# Patient Record
Sex: MALE | Race: BLACK OR AFRICAN AMERICAN | NOT HISPANIC OR LATINO | Employment: FULL TIME | URBAN - METROPOLITAN AREA
[De-identification: names, ages, dates, MRNs, and addresses within clinical notes are randomized per-mention and may not be internally consistent; named-entity substitution may affect disease eponyms.]

---

## 2024-01-21 ENCOUNTER — APPOINTMENT (OUTPATIENT)
Dept: CARDIOLOGY | Facility: HOSPITAL | Age: 29
End: 2024-01-21

## 2024-01-21 ENCOUNTER — HOSPITAL ENCOUNTER (EMERGENCY)
Facility: HOSPITAL | Age: 29
Discharge: HOME | End: 2024-01-21
Attending: EMERGENCY MEDICINE

## 2024-01-21 VITALS
RESPIRATION RATE: 18 BRPM | OXYGEN SATURATION: 99 % | DIASTOLIC BLOOD PRESSURE: 69 MMHG | WEIGHT: 135 LBS | HEART RATE: 56 BPM | HEIGHT: 71 IN | BODY MASS INDEX: 18.9 KG/M2 | TEMPERATURE: 97.9 F | SYSTOLIC BLOOD PRESSURE: 126 MMHG

## 2024-01-21 DIAGNOSIS — R42 VERTIGO: Primary | ICD-10-CM

## 2024-01-21 LAB
ALBUMIN SERPL BCP-MCNC: 4.3 G/DL (ref 3.4–5)
ALP SERPL-CCNC: 38 U/L (ref 33–120)
ALT SERPL W P-5'-P-CCNC: 14 U/L (ref 10–52)
ANION GAP SERPL CALC-SCNC: 10 MMOL/L (ref 10–20)
AST SERPL W P-5'-P-CCNC: 17 U/L (ref 9–39)
BASOPHILS # BLD AUTO: 0.05 X10*3/UL (ref 0–0.1)
BASOPHILS NFR BLD AUTO: 1.1 %
BILIRUB SERPL-MCNC: 1 MG/DL (ref 0–1.2)
BUN SERPL-MCNC: 12 MG/DL (ref 6–23)
CALCIUM SERPL-MCNC: 9.1 MG/DL (ref 8.6–10.3)
CARDIAC TROPONIN I PNL SERPL HS: <3 NG/L (ref 0–20)
CHLORIDE SERPL-SCNC: 104 MMOL/L (ref 98–107)
CO2 SERPL-SCNC: 30 MMOL/L (ref 21–32)
CREAT SERPL-MCNC: 1 MG/DL (ref 0.5–1.3)
EGFRCR SERPLBLD CKD-EPI 2021: >90 ML/MIN/1.73M*2
EOSINOPHIL # BLD AUTO: 0.12 X10*3/UL (ref 0–0.7)
EOSINOPHIL NFR BLD AUTO: 2.7 %
ERYTHROCYTE [DISTWIDTH] IN BLOOD BY AUTOMATED COUNT: 13.6 % (ref 11.5–14.5)
FLUAV RNA RESP QL NAA+PROBE: NOT DETECTED
FLUBV RNA RESP QL NAA+PROBE: NOT DETECTED
GLUCOSE SERPL-MCNC: 74 MG/DL (ref 74–99)
HCT VFR BLD AUTO: 46.6 % (ref 41–52)
HGB BLD-MCNC: 15.1 G/DL (ref 13.5–17.5)
IMM GRANULOCYTES # BLD AUTO: 0.01 X10*3/UL (ref 0–0.7)
IMM GRANULOCYTES NFR BLD AUTO: 0.2 % (ref 0–0.9)
LYMPHOCYTES # BLD AUTO: 1.37 X10*3/UL (ref 1.2–4.8)
LYMPHOCYTES NFR BLD AUTO: 31.1 %
MCH RBC QN AUTO: 27.5 PG (ref 26–34)
MCHC RBC AUTO-ENTMCNC: 32.4 G/DL (ref 32–36)
MCV RBC AUTO: 85 FL (ref 80–100)
MONOCYTES # BLD AUTO: 0.38 X10*3/UL (ref 0.1–1)
MONOCYTES NFR BLD AUTO: 8.6 %
NEUTROPHILS # BLD AUTO: 2.47 X10*3/UL (ref 1.2–7.7)
NEUTROPHILS NFR BLD AUTO: 56.3 %
NRBC BLD-RTO: 0 /100 WBCS (ref 0–0)
PLATELET # BLD AUTO: 199 X10*3/UL (ref 150–450)
POTASSIUM SERPL-SCNC: 4.3 MMOL/L (ref 3.5–5.3)
PROT SERPL-MCNC: 7.1 G/DL (ref 6.4–8.2)
RBC # BLD AUTO: 5.49 X10*6/UL (ref 4.5–5.9)
RSV RNA RESP QL NAA+PROBE: NOT DETECTED
SARS-COV-2 RNA RESP QL NAA+PROBE: NOT DETECTED
SODIUM SERPL-SCNC: 140 MMOL/L (ref 136–145)
WBC # BLD AUTO: 4.4 X10*3/UL (ref 4.4–11.3)

## 2024-01-21 PROCEDURE — 87637 SARSCOV2&INF A&B&RSV AMP PRB: CPT | Performed by: EMERGENCY MEDICINE

## 2024-01-21 PROCEDURE — 99283 EMERGENCY DEPT VISIT LOW MDM: CPT

## 2024-01-21 PROCEDURE — 93005 ELECTROCARDIOGRAM TRACING: CPT

## 2024-01-21 PROCEDURE — 85025 COMPLETE CBC W/AUTO DIFF WBC: CPT | Performed by: EMERGENCY MEDICINE

## 2024-01-21 PROCEDURE — 93010 ELECTROCARDIOGRAM REPORT: CPT | Performed by: EMERGENCY MEDICINE

## 2024-01-21 PROCEDURE — 36415 COLL VENOUS BLD VENIPUNCTURE: CPT | Performed by: EMERGENCY MEDICINE

## 2024-01-21 PROCEDURE — 99284 EMERGENCY DEPT VISIT MOD MDM: CPT | Performed by: EMERGENCY MEDICINE

## 2024-01-21 PROCEDURE — 84075 ASSAY ALKALINE PHOSPHATASE: CPT | Performed by: EMERGENCY MEDICINE

## 2024-01-21 PROCEDURE — 84484 ASSAY OF TROPONIN QUANT: CPT | Performed by: EMERGENCY MEDICINE

## 2024-01-21 PROCEDURE — 2500000001 HC RX 250 WO HCPCS SELF ADMINISTERED DRUGS (ALT 637 FOR MEDICARE OP): Performed by: EMERGENCY MEDICINE

## 2024-01-21 RX ORDER — MECLIZINE HYDROCHLORIDE 25 MG/1
25 TABLET ORAL ONCE
Status: COMPLETED | OUTPATIENT
Start: 2024-01-21 | End: 2024-01-21

## 2024-01-21 RX ORDER — MECLIZINE HYDROCHLORIDE 25 MG/1
25 TABLET ORAL 3 TIMES DAILY PRN
Qty: 9 TABLET | Refills: 0 | Status: SHIPPED | OUTPATIENT
Start: 2024-01-21 | End: 2024-01-24

## 2024-01-21 RX ADMIN — MECLIZINE HYDROCHLORIDE 25 MG: 25 TABLET ORAL at 18:01

## 2024-01-21 ASSESSMENT — COLUMBIA-SUICIDE SEVERITY RATING SCALE - C-SSRS
1. IN THE PAST MONTH, HAVE YOU WISHED YOU WERE DEAD OR WISHED YOU COULD GO TO SLEEP AND NOT WAKE UP?: NO
2. HAVE YOU ACTUALLY HAD ANY THOUGHTS OF KILLING YOURSELF?: NO
6. HAVE YOU EVER DONE ANYTHING, STARTED TO DO ANYTHING, OR PREPARED TO DO ANYTHING TO END YOUR LIFE?: NO

## 2024-01-21 ASSESSMENT — PAIN - FUNCTIONAL ASSESSMENT: PAIN_FUNCTIONAL_ASSESSMENT: 0-10

## 2024-01-21 ASSESSMENT — LIFESTYLE VARIABLES
REASON UNABLE TO ASSESS: NO
HAVE PEOPLE ANNOYED YOU BY CRITICIZING YOUR DRINKING: NO
HAVE YOU EVER FELT YOU SHOULD CUT DOWN ON YOUR DRINKING: NO
EVER FELT BAD OR GUILTY ABOUT YOUR DRINKING: NO
EVER HAD A DRINK FIRST THING IN THE MORNING TO STEADY YOUR NERVES TO GET RID OF A HANGOVER: NO

## 2024-01-21 ASSESSMENT — PAIN SCALES - GENERAL
PAINLEVEL_OUTOF10: 0 - NO PAIN
PAINLEVEL_OUTOF10: 0 - NO PAIN

## 2024-01-21 NOTE — DISCHARGE INSTRUCTIONS
Please return to the ER or seek immediate medical attention if you experience worsening headache, persistent vertigo, vomiting, fever of 38C (100.4) or higher, confusion, weakness, loss of motion in your arms or legs, loss of control of your urine or stool, neck pain, fainting, seizure, or any new or worsening symptoms.     You are welcome back any time. Thank you for entrusting your care to us, I hope we made your visit as pleasant as possible. Wishing you well!    Dr. Lawson

## 2024-01-21 NOTE — ED PROVIDER NOTES
EMERGENCY DEPARTMENT ENCOUNTER      Pt Name: Terrence Rodriguez  MRN: [id number]  Birthdate 1995  Date of evaluation: 1/21/2024  Provider: Charles Lawson DO    CHIEF COMPLAINT       Chief Complaint   Patient presents with    Dizziness     HISTORY OF PRESENT ILLNESS    Terrence Rodriguez is a 28 y.o. year old male who presents to the ER for dizziness. He describes it as more imbalance than lightheadedness, endorses difficulty walking In a straight line. Symptoms started while at work, approx. 4pm, have been persistent. He does endorse tobacco and marijuana use, last 0900. Denies headache, changes to vision/hearing, tinnitus, weakness, chest pain/dyspnea, nausea/vomiting, fever, syncope/presyncope. Endorses social drinking, last on new years.      PAST MEDICAL HISTORY   History reviewed. No pertinent past medical history.  CURRENT MEDICATIONS       Discharge Medication List as of 1/21/2024  7:08 PM        SURGICAL HISTORY     History reviewed. No pertinent surgical history.  ALLERGIES     Patient has no known allergies.  FAMILY HISTORY     No family history on file.  SOCIAL HISTORY       Social History     Tobacco Use    Smoking status: Unknown    Smokeless tobacco: Not on file   Substance Use Topics    Alcohol use: Not on file    Drug use: Not on file     SCREENINGS                  PHYSICAL EXAM  (up to 7 for level 4, 8 or more for level 5)     ED Triage Vitals   Temp Heart Rate Respirations BP   01/21/24 1712 01/21/24 1712 01/21/24 1712 01/21/24 1712   36.6 °C (97.9 °F) 64 16 130/80      Pulse Ox Temp Source Heart Rate Source Patient Position   01/21/24 1712 01/21/24 1712 01/21/24 1712 01/21/24 1831   98 % Temporal Monitor Sitting      BP Location FiO2 (%)     01/21/24 1831 --     Right arm        Physical Exam  Vitals and nursing note reviewed.   Constitutional:       General: He is not in acute distress.     Appearance: Normal appearance. He is not ill-appearing.   HENT:      Head: Normocephalic and  atraumatic. No contusion or laceration.      Jaw: No trismus or malocclusion.      Right Ear: Tympanic membrane, ear canal and external ear normal. There is no impacted cerumen.      Left Ear: Tympanic membrane, ear canal and external ear normal. There is no impacted cerumen.      Mouth/Throat:      Mouth: Mucous membranes are moist.      Pharynx: Oropharynx is clear.   Eyes:      Extraocular Movements: Extraocular movements intact.      Pupils: Pupils are equal, round, and reactive to light.   Neck:      Trachea: No tracheal deviation.   Cardiovascular:      Rate and Rhythm: Normal rate and regular rhythm.      Pulses: Normal pulses.   Pulmonary:      Effort: No respiratory distress.      Breath sounds: No wheezing, rhonchi or rales.   Chest:      Chest wall: No tenderness.   Abdominal:      General: Abdomen is flat. There is no distension.      Palpations: Abdomen is soft. There is no mass.      Tenderness: There is no abdominal tenderness. There is no right CVA tenderness or left CVA tenderness.   Musculoskeletal:         General: No tenderness or signs of injury.      Cervical back: Normal range of motion. No tenderness.      Right lower leg: No edema.      Left lower leg: No edema.   Skin:     Coloration: Skin is not jaundiced or pale.      Findings: No rash or wound.   Neurological:      Mental Status: He is alert and oriented to person, place, and time.      Sensory: No sensory deficit.      Comments: [place] hallpike positive on left. HINTS negative. Romberg negative. Slight wobble with pivot on gait exam. No cranial nerve deficits, strength, or sensorimotor deficits   Psychiatric:         Speech: Speech normal.         Behavior: Behavior normal.         Thought Content: Thought content does not include homicidal or suicidal ideation.         Judgment: Judgment normal.        DIAGNOSTIC RESULTS   LABS:  Labs Reviewed   COMPREHENSIVE METABOLIC PANEL - Normal       Result Value    Glucose 74      Sodium 140       Potassium 4.3      Chloride 104      Bicarbonate 30      Anion Gap 10      Urea Nitrogen 12      Creatinine 1.00      eGFR >90      Calcium 9.1      Albumin 4.3      Alkaline Phosphatase 38      Total Protein 7.1      AST 17      Bilirubin, Total 1.0      ALT 14     SARS-COV-2 AND INFLUENZA A/B PCR - Normal    Flu A Result Not Detected      Flu B Result Not Detected      Coronavirus 2019, PCR Not Detected      Narrative:     This assay has received FDA Emergency Use Authorization (EUA) and  is only authorized for the duration of time that circumstances exist to justify the authorization of the emergency use of in vitro diagnostic tests for the detection of SARS-CoV-2 virus and/or diagnosis of COVID-19 infection under section 564(b)(1) of the Act, 21 U.S.C. 360bbb-3(b)(1). Testing for SARS-CoV-2 is only recommended for patients who meet current clinical and/or epidemiological criteria as defined by federal, state, or local public health directives. This assay is an in vitro diagnostic nucleic acid amplification test for the qualitative detection of SARS-CoV-2, Influenza A, and Influenza B from nasopharyngeal specimens and has been validated for use at [facility]. Negative results do not preclude COVID-19 infections or Influenza A/B infections, and should not be used as the sole basis for diagnosis, treatment, or other management decisions. If Influenza A/B and RSV PCR results are negative, testing for Parainfluenza virus, Adenovirus and Metapneumovirus is routinely performed for [facility] pediatric oncology and intensive care inpatients, and is available on other patients by placing an add-on request.    TROPONIN I, HIGH SENSITIVITY - Normal    Troponin I, High Sensitivity <3      Narrative:     Less than 99th percentile of normal range cutoff-  Female and children under 18 years old <14 ng/L; Male <21 ng/L: Negative  Repeat testing should be performed if clinically indicated.     Female and  children under 18 years old 14-50 ng/L; Male 21-50 ng/L:  Consistent with possible cardiac damage and possible increased clinical   risk. Serial measurements may help to assess extent of myocardial damage.     >50 ng/L: Consistent with cardiac damage, increased clinical risk and  myocardial infarction. Serial measurements may help assess extent of   myocardial damage.      NOTE: Children less than 1 year old may have higher baseline troponin   levels and results should be interpreted in conjunction with the overall   clinical context.     NOTE: Troponin I testing is performed using a different   testing methodology at [facility] than at other   [facility]. Direct result comparisons should only   be made within the same method.   RSV PCR - Normal    RSV PCR Not Detected      Narrative:     This assay is an FDA-cleared, in vitro diagnostic nucleic acid amplification test for the detection of RSV from nasopharyngeal specimens, and has been validated for use at [facility]. Negative results do not preclude RSV infections, and should not be used as the sole basis for diagnosis, treatment, or other management decisions. If Influenza A/B and RSV PCR results are negative, testing for Parainfluenza virus, Adenovirus and Metapneumovirus is routinely performed for pediatric oncology and intensive care inpatients at [facility] and is available on other patients by placing an add-on request.       CBC WITH AUTO DIFFERENTIAL    WBC 4.4      nRBC 0.0      RBC 5.49      Hemoglobin 15.1      Hematocrit 46.6      MCV 85      MCH 27.5      MCHC 32.4      RDW 13.6      Platelets 199      Neutrophils % 56.3      Immature Granulocytes %, Automated 0.2      Lymphocytes % 31.1      Monocytes % 8.6      Eosinophils % 2.7      Basophils % 1.1      Neutrophils Absolute 2.47      Immature Granulocytes Absolute, Automated 0.01      Lymphocytes Absolute 1.37      Monocytes Absolute 0.38      Eosinophils  "Absolute 0.12      Basophils Absolute 0.05       All other labs were within normal range or not returned as of this dictation.  Imaging  No orders to display      Procedure  Procedures  EMERGENCY DEPARTMENT COURSE/MDM:   Medical Decision Making    Vitals:    Vitals:    01/21/24 1712 01/21/24 1831   BP: 130/80 126/69   BP Location:  Right arm   Patient Position:  Sitting   Pulse: 64 56   Resp: 16 18   Temp: 36.6 °C (97.9 °F)    TempSrc: Temporal    SpO2: 98% 99%   Weight: 61.2 kg (135 lb)    Height: 1.803 m (5' 11\")      Terrence Rodriguez is a male 28 y.o. who presents to the ER for dizziness. On arrival the patients vital signs were: Afebrile, Reguar HR, Normotensive, Regular RR, and Oxygenating well on room air. History obtained from: patient. H/P points to more peripheral vertigo rather than central vertigo, CT head deferred. Basic labwork initiated along with trial of meclizine.    EKG 1749 Rhythm: Sinus Ventricular rate: 55 Intervals (-200 /QRS  /QT >450M or >470F):  QRS 86 Qtc 384 Blocks: None Potential signs of ischemia: No pathological Q waves, contiguous ST elevations or depressions, biphasic T waves, or  T wave inversions     My independent interpretation of Labs:   CBC: No acute pathological leukocytosis, leukopenia, thrombocytosis, thrombocytopenia, or anemia  CMP: No acute electrolyte or hepatorenal abnormality  Troponin: No elevation of cardiac enzymes  Viral swabs: influenza A/B negative, Covid negative, RSV negative    On reassessment Vertigo resolved    ED Course as of 01/22/24 0011   Sun Jan 21, 2024   1850 Vertigo resolved [CB]      ED Course User Index  [CB] Charles Lawson DO         Diagnoses as of 01/22/24 0011   Vertigo       Consultant discussions: none  Diagnostic testing considered but not performed: CT head not indicated  External Records Reviewed: I reviewed recent and relevant outside records including inpatient notes, outpatient records  Social Determinants " Affecting Care: Pt needed PCP, one was provided  Prescription Drug Consideration: meclizine Rx'd for home use    Shared decision making for disposition  Patient and/or patient´s representative was counseled regarding labs, imaging, likely diagnosis. All questions were answered. Recommendation was made   for discharge home. The patient agreed and was discharged home in stable condition with appropriate relevant educational materials. Return precautions were provided which included worsening headache, persistent vertigo, vomiting, fever of 38C (100.4) or higher, confusion, weakness, loss of motion in your arms or legs, loss of control of your urine or stool, neck pain, fainting, seizure, or any new or worsening symptoms. .     ED Medications administered this visit:    Medications   meclizine (Antivert) tablet 25 mg (25 mg oral Given 1/21/24 1801)       New Prescriptions from this visit:    Discharge Medication List as of 1/21/2024  7:08 PM        START taking these medications    Details   meclizine (Antivert) 25 mg tablet Take 1 tablet (25 mg) by mouth 3 times a day as needed for dizziness for up to 3 days., Starting Sun 1/21/2024, Until Wed 1/24/2024 at 2359, Normal             Follow-up:  Toy Macias MD  [address] [facility]  [address]  [place] [id number] [phone]              Final Impression:   1. Vertigo          I reviewed the case with the attending ED physician. The attending ED physician agrees with the plan.   Charles Lawson DO  PGY-2  Emergency Medicine      Please excuse any misspellings or unintended errors related to the Dragon speech recognition software used to dictate this note.       Charles Lawson DO  Resident  01/22/24 0011       Charles Lawson DO  Resident  01/22/24 0014

## 2024-01-31 LAB
ATRIAL RATE: 55 BPM
P AXIS: 49 DEGREES
P OFFSET: 190 MS
P ONSET: 140 MS
PR INTERVAL: 164 MS
Q ONSET: 222 MS
QRS COUNT: 9 BEATS
QRS DURATION: 86 MS
QT INTERVAL: 402 MS
QTC CALCULATION(BAZETT): 384 MS
QTC FREDERICIA: 390 MS
R AXIS: 45 DEGREES
T AXIS: 34 DEGREES
T OFFSET: 423 MS
VENTRICULAR RATE: 55 BPM

## 2024-02-20 ENCOUNTER — HOSPITAL ENCOUNTER (EMERGENCY)
Facility: HOSPITAL | Age: 29
Discharge: HOME | End: 2024-02-20
Payer: MEDICARE

## 2024-02-20 VITALS
SYSTOLIC BLOOD PRESSURE: 138 MMHG | DIASTOLIC BLOOD PRESSURE: 66 MMHG | TEMPERATURE: 97.7 F | HEART RATE: 59 BPM | RESPIRATION RATE: 18 BRPM | HEIGHT: 71 IN | WEIGHT: 140 LBS | BODY MASS INDEX: 19.6 KG/M2
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
## 2024-02-20 PROCEDURE — 99281 EMR DPT VST MAYX REQ PHY/QHP: CPT

## 2024-02-20 ASSESSMENT — PAIN SCALES - GENERAL: PAINLEVEL_OUTOF10: 0 - NO PAIN

## 2024-02-20 ASSESSMENT — LIFESTYLE VARIABLES
HAVE PEOPLE ANNOYED YOU BY CRITICIZING YOUR DRINKING: NO
HAVE YOU EVER FELT YOU SHOULD CUT DOWN ON YOUR DRINKING: NO
EVER HAD A DRINK FIRST THING IN THE MORNING TO STEADY YOUR NERVES TO GET RID OF A HANGOVER: NO
EVER FELT BAD OR GUILTY ABOUT YOUR DRINKING: NO

## 2024-02-20 ASSESSMENT — COLUMBIA-SUICIDE SEVERITY RATING SCALE - C-SSRS
1. IN THE PAST MONTH, HAVE YOU WISHED YOU WERE DEAD OR WISHED YOU COULD GO TO SLEEP AND NOT WAKE UP?: NO
6. HAVE YOU EVER DONE ANYTHING, STARTED TO DO ANYTHING, OR PREPARED TO DO ANYTHING TO END YOUR LIFE?: NO
2. HAVE YOU ACTUALLY HAD ANY THOUGHTS OF KILLING YOURSELF?: NO

## 2024-02-20 ASSESSMENT — PAIN - FUNCTIONAL ASSESSMENT: PAIN_FUNCTIONAL_ASSESSMENT: 0-10
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
